# Patient Record
(demographics unavailable — no encounter records)

---

## 2025-03-26 NOTE — PHYSICAL EXAM
[Normal] : Gait: normal [Causey's Sign] : negative Causey's sign [Pronator Drift] : negative pronator drift [SLR] : negative straight leg raise [de-identified] : 5 out of 5 motor strength, sensation is intact and symmetrical full range of motion flexion extension and rotation, no palpatory tenderness full range of motion of hips knees shoulders and elbows (all four extremities), no atrophy, negative straight leg raise, no pathological reflexes, no swelling, normal ambulation, no apparent distress skin is intact, no palpable lymph nodes, no upper or lower extremity instability, alert and oriented x3 and normal mood. Normal finger-to nose test.  [de-identified] : MRI LUMBAR SPINE WITHOUT CONTRAST   2/12/25  ([facility])   HISTORY: Lower back pain.  TECHNIQUE: Multiplanar, multi-sequential MRI of the lumbar spine was obtained on a 1.5T scanner using a standard protocol.  COMPARISON:  None  FINDINGS:  For purposes of this dictation, the last well-formed disc space will be labeled L5-S1.  OSSEOUS STRUCTURES: Vertebral body heights are preserved. No marrow edema or destructive marrow infiltrative process.   ALIGNMENT: Grade 2 anterolisthesis measuring 14 mm at L5-S1. Bilateral L5 pars interarticularis defects.  SPINAL CORD AND CONUS MEDULLARIS: Normal signal.  PARASPINAL AND INTRA-ABDOMINAL SOFT TISSUES: Unremarkable.  INCLUDED THORACIC SPINE AND SACRUM: Unremarkable.  DISCS: Moderate to severe degenerative moderate degenerative disc disease at L5-S1 with loss of disc height, endplate osteophyte formation and degenerative fatty and edematous endplate marrow signal change.  The following axial levels are imaged and detailed below:  L1-L2: No disc bulging or herniation. No spinal canal or foraminal stenosis.  L2-L3: No disc bulging or herniation. No spinal canal or foraminal stenosis.  L3-L4: No disc bulging or herniation. No spinal canal or foraminal stenosis.  L4-L5: There is a disc bulge with a central disc protrusion and annular tear. There is no central spinal canal or neural foraminal stenosis.  L5-S1: There is uncovering the posterior aspect intervertebral disc secondary to anterolisthesis. There is a disc bulge. There is no central spinal canal stenosis. There is moderate to severe distortion and stenosis of the neural foramina.  IMPRESSION:  MRI of the lumbar spine demonstrates:  1.  Bilateral L5 pars interarticularis defects, with grade 2 anterolisthesis and moderate degenerative disc disease at L5-S1. 2.  Moderate to severe distortion and stenosis of the neural foramina bilaterally at L5-S1. 3.  Disc bulge with a central disc protrusion and annular tear at L4-5.   X-RAY LUMBAR SPINE MINIMUM 4 VIEWS   2/4/25   ([facility])   HISTORY:  Back pain.  TECHNIQUE:  4 radiographic views of the lumbar spine were obtained.  COMPARISON:  None.   FINDINGS:   Five lumbar type vertebral bodies are demonstrated. Lumbar pedicles and spinous processes appear to be intact. The SI joints are symmetric. Vertebral body heights are maintained. Marked disc space narrowing at L5-S1 is observed.  Grade 2 anterolisthesis at L5-S1 is observed with no associated dynamic instability between flexion and extension views. There appears to be bilateral L5 spondylolysis.     IMPRESSION:   Static grade 2 anterolisthesis at L5-S1 with apparent bilateral L5 spondylolysis.  Marked disc space narrowing at L5-S1.

## 2025-03-26 NOTE — HISTORY OF PRESENT ILLNESS
[Stable] : stable [de-identified] : 48 year old male presents for evaluation of lower back pain with bilateral lower extremity numbness x 6 months. Denies injury.  He notes that he has numbness of the anterior thighs that at times extends to the ankles. He also notes weakness of the legs. He denies radicular pain.  Prolonged sitting and standing aggravates the numbness. Does not take medications for this. Has not tried PT or chiropractic care. Denies JOSE ALEJANDRO. He saw an outside surgeon who recommended PT.  Has MRI Lumbar.  PMHx: denies significant medical history  No fever chills sweats nausea vomiting no bowel or bladder dysfunction, no recent weight loss or gain no night pain. This history is in addition to the intake form that I personally reviewed.

## 2025-03-26 NOTE — DISCUSSION/SUMMARY
[de-identified] : L5-S1 isthmic spondylolisthesis. Discussed all options.  PT and NSAIDs PRN. F/U 1 month. Discussed pain management. All options discussed including rest, medicine, home exercise, acupuncture, Chiropractic care, Physical Therapy, Pain management, and last resort surgery. All questions were answered, all alternatives discussed and the patient is in complete agreement with the treatment plan which the patient contributed to and discussed with me through the shared decision making process. Follow-up appointment as instructed. Any issues and the patient will call or come in sooner.

## 2025-05-14 NOTE — HISTORY OF PRESENT ILLNESS
[Improving] : improving [de-identified] : 48 year old male presents for evaluation of lower back pain with bilateral lower extremity numbness x 6 months. Denies injury.  He notes that he has numbness of the anterior thighs that at times extends to the ankles. He also notes weakness of the legs. He denies radicular pain.  Prolonged sitting and standing aggravates the numbness. Does not take medications for this. Denies JOSE ALEJANDRO. Was referred to PT at last visit. Ha shad 5 visits of PT and is continuing with lumbar HEP which he states helped. He is feeling better.  Has MRI Lumbar.  PMHx: denies significant medical history  No fever chills sweats nausea vomiting no bowel or bladder dysfunction, no recent weight loss or gain no night pain. This history is in addition to the intake form that I personally reviewed.

## 2025-05-14 NOTE — DISCUSSION/SUMMARY
[de-identified] : L5-S1 isthmic spondylolisthesis. Discussed all options.  Feeling better.  HEP/Diclofenac PRN. All options discussed including rest, medicine, home exercise, acupuncture, Chiropractic care, Physical Therapy, Pain management, and last resort surgery. All questions were answered, all alternatives discussed and the patient is in complete agreement with the treatment plan which the patient contributed to and discussed with me through the shared decision making process. Follow-up appointment as instructed. Any issues and the patient will call or come in sooner.

## 2025-05-14 NOTE — PHYSICAL EXAM
[Normal] : Gait: normal [Causey's Sign] : negative Causey's sign [Pronator Drift] : negative pronator drift [SLR] : negative straight leg raise [de-identified] : 5 out of 5 motor strength, sensation is intact and symmetrical full range of motion flexion extension and rotation, no palpatory tenderness full range of motion of hips knees shoulders and elbows (all four extremities), no atrophy, negative straight leg raise, no pathological reflexes, no swelling, normal ambulation, no apparent distress skin is intact, no palpable lymph nodes, no upper or lower extremity instability, alert and oriented x3 and normal mood. Normal finger-to nose test.  [de-identified] : MRI LUMBAR SPINE WITHOUT CONTRAST   2/12/25  ([facility])   HISTORY: Lower back pain.  TECHNIQUE: Multiplanar, multi-sequential MRI of the lumbar spine was obtained on a 1.5T scanner using a standard protocol.  COMPARISON:  None  FINDINGS:  For purposes of this dictation, the last well-formed disc space will be labeled L5-S1.  OSSEOUS STRUCTURES: Vertebral body heights are preserved. No marrow edema or destructive marrow infiltrative process.   ALIGNMENT: Grade 2 anterolisthesis measuring 14 mm at L5-S1. Bilateral L5 pars interarticularis defects.  SPINAL CORD AND CONUS MEDULLARIS: Normal signal.  PARASPINAL AND INTRA-ABDOMINAL SOFT TISSUES: Unremarkable.  INCLUDED THORACIC SPINE AND SACRUM: Unremarkable.  DISCS: Moderate to severe degenerative moderate degenerative disc disease at L5-S1 with loss of disc height, endplate osteophyte formation and degenerative fatty and edematous endplate marrow signal change.  The following axial levels are imaged and detailed below:  L1-L2: No disc bulging or herniation. No spinal canal or foraminal stenosis.  L2-L3: No disc bulging or herniation. No spinal canal or foraminal stenosis.  L3-L4: No disc bulging or herniation. No spinal canal or foraminal stenosis.  L4-L5: There is a disc bulge with a central disc protrusion and annular tear. There is no central spinal canal or neural foraminal stenosis.  L5-S1: There is uncovering the posterior aspect intervertebral disc secondary to anterolisthesis. There is a disc bulge. There is no central spinal canal stenosis. There is moderate to severe distortion and stenosis of the neural foramina.  IMPRESSION:  MRI of the lumbar spine demonstrates:  1.  Bilateral L5 pars interarticularis defects, with grade 2 anterolisthesis and moderate degenerative disc disease at L5-S1. 2.  Moderate to severe distortion and stenosis of the neural foramina bilaterally at L5-S1. 3.  Disc bulge with a central disc protrusion and annular tear at L4-5.   X-RAY LUMBAR SPINE MINIMUM 4 VIEWS   2/4/25   ([facility])   HISTORY:  Back pain.  TECHNIQUE:  4 radiographic views of the lumbar spine were obtained.  COMPARISON:  None.   FINDINGS:   Five lumbar type vertebral bodies are demonstrated. Lumbar pedicles and spinous processes appear to be intact. The SI joints are symmetric. Vertebral body heights are maintained. Marked disc space narrowing at L5-S1 is observed.  Grade 2 anterolisthesis at L5-S1 is observed with no associated dynamic instability between flexion and extension views. There appears to be bilateral L5 spondylolysis.     IMPRESSION:   Static grade 2 anterolisthesis at L5-S1 with apparent bilateral L5 spondylolysis.  Marked disc space narrowing at L5-S1.